# Patient Record
Sex: FEMALE | Race: WHITE | Employment: FULL TIME | URBAN - METROPOLITAN AREA
[De-identification: names, ages, dates, MRNs, and addresses within clinical notes are randomized per-mention and may not be internally consistent; named-entity substitution may affect disease eponyms.]

---

## 2017-02-26 ENCOUNTER — HOSPITAL ENCOUNTER (OUTPATIENT)
Age: 57
Discharge: HOME | End: 2017-02-26
Attending: EMERGENCY MEDICINE
Payer: COMMERCIAL

## 2017-02-26 ENCOUNTER — APPOINTMENT (OUTPATIENT)
Dept: GENERAL RADIOLOGY | Age: 57
End: 2017-02-26
Attending: EMERGENCY MEDICINE
Payer: COMMERCIAL

## 2017-02-26 VITALS
SYSTOLIC BLOOD PRESSURE: 113 MMHG | WEIGHT: 289 LBS | HEIGHT: 72 IN | OXYGEN SATURATION: 96 % | RESPIRATION RATE: 18 BRPM | TEMPERATURE: 98 F | HEART RATE: 89 BPM | DIASTOLIC BLOOD PRESSURE: 62 MMHG | BODY MASS INDEX: 39.14 KG/M2

## 2017-02-26 DIAGNOSIS — S86.911A KNEE STRAIN, RIGHT, INITIAL ENCOUNTER: Primary | ICD-10-CM

## 2017-02-26 DIAGNOSIS — S76.101A UNSPECIFIED INJURY OF RIGHT QUADRICEPS MUSCLE, FASCIA AND TENDON, INITIAL ENCOUNTER: ICD-10-CM

## 2017-02-26 PROCEDURE — 99203 OFFICE O/P NEW LOW 30 MIN: CPT

## 2017-02-26 PROCEDURE — 73560 X-RAY EXAM OF KNEE 1 OR 2: CPT

## 2017-02-26 RX ORDER — DOXEPIN HYDROCHLORIDE 50 MG/1
1 CAPSULE ORAL DAILY
COMMUNITY

## 2017-02-26 RX ORDER — ASPIRIN 81 MG/1
TABLET, CHEWABLE ORAL DAILY
COMMUNITY

## 2017-02-26 NOTE — ED PROVIDER NOTES
Patient Seen in: [facility] In [address] [place] [address]    History   Patient presents with:  Lower Extremity Injury (musculoskeletal)    Stated Complaint: RT Knee/Leg Pain    HPI  5:30 AM patient was coming down the stairs when she felt sudden pain Constitutional: She is oriented to person, place, and time. She appears well-developed and well-nourished. No distress. Well appearing   HENT:   Head: Normocephalic and atraumatic.    Right Ear: External ear normal.   Left Ear: External ear normal.   Eyes X-ray results were discussed with the patient. Patient understands that she needs to have further evaluation by an orthopedic doctor.   It is unclear at this point whether there is been an injury to the quadricep muscle or tendon has there is tenderness wh

## 2017-02-26 NOTE — ED NOTES
Unable to fit with walker cane given with demo and return demo felt secure long leg knee immoblizer given with instructions ice elevate motrin for pain follow up with o/c ortho and call insurance for md if needed.  Note for work provided

## (undated) NOTE — ED AVS SNAPSHOT
[facility] in [place] [facility] [address]  [address] [facility] [place]    Phone:  [phone]    Fax:  [phone]           Amado Sanchez   MRN: [id number]    Department:  [facility] in [address] [place] [address]   Date of Visit: Discharge References/Attachments     STRAINS AND SPRAINS, SELF-CARE FOR (ENGLISH)    STRAINS AND SPRAINS, TREATING (ENGLISH)    GASTROCNEMIUS MUSCLE TEAR, UNDERSTANDING (ENGLISH)      Disclosure     Insurance plans vary and the physician(s) referred by the Registration line at [phone] or find a doctor online by visiting [email or web address].    IF THERE IS ANY CHANGE OR WORSENING OF YOUR CONDITION, CALL YOUR PRIMARY CARE PHYSICIAN AT ONCE OR GO TO [address] [place] [address].     If you have been prescribed a - If you are a smoker or have smoked in the last 12 months, we encourage you to explore options for quitting.     - If you have concerns related to behavioral health issues or thoughts of harming yourself, contact [facility] a

## (undated) NOTE — LETTER
[address]  Dept: [phone]  Dept Fax: [phone]  Loc: [phone]      February 26, 2017    Patient: Liz Gaston   Date of Visit: 2/26/2017       To Whom It May Concern:     Grace Alfred